# Patient Record
Sex: MALE | URBAN - METROPOLITAN AREA
[De-identification: names, ages, dates, MRNs, and addresses within clinical notes are randomized per-mention and may not be internally consistent; named-entity substitution may affect disease eponyms.]

---

## 2019-09-04 ENCOUNTER — TELEPHONE (OUTPATIENT)
Dept: RHEUMATOLOGY | Age: 39
End: 2019-09-04

## 2019-09-04 NOTE — TELEPHONE ENCOUNTER
Received message from Dr. Matthew Alicea on 9/4/2019 to call patient to let patient know he  do not treat symptoms that the call center had booked npt for on 9/6/2019 left message on patient voice mail. sdh

## 2019-09-05 ENCOUNTER — TELEPHONE (OUTPATIENT)
Dept: RHEUMATOLOGY | Age: 39
End: 2019-09-05

## 2019-09-05 NOTE — TELEPHONE ENCOUNTER
Return call back to patient on 9/5/2019 left voice message to have patient to return call back to office due to appt on 9/6/2019 that doc do not treat symptoms. .. sdh

## 2019-09-05 NOTE — TELEPHONE ENCOUNTER
Return call back to on 9/5/2019 patient 3rd attempt left voice message to call back into the office.  sdh

## 2019-09-05 NOTE — TELEPHONE ENCOUNTER
----- Message from Judith Stapleton RN sent at 9/5/2019  1:23 PM EDT -----  Regarding: FW: Dr. Toby Umanzor      ----- Message -----  From: Candice Uriostegui  Sent: 9/5/2019   1:03 PM EDT  To: Judith Stapleton RN  Subject: FW: Dr. Toby Umanzor                              ----- Message -----  From:  Joy Jiménez  Sent: 9/5/2019   9:46 AM EDT  To: [facility] [place]  Subject: Dr. Toby Umanzor                              Caller: Spouse, Ashley Nurse  Reason for call: concerned about message that Dr. Elda Gaston will not see PT  Callback requested: [place] contact: [phone]  Additional details: appt set 9/6/2019 at 11am

## 2019-09-17 ENCOUNTER — TELEPHONE (OUTPATIENT)
Dept: RHEUMATOLOGY | Age: 39
End: 2019-09-17

## 2019-09-18 ENCOUNTER — OFFICE VISIT (OUTPATIENT)
Dept: RHEUMATOLOGY | Age: 39
End: 2019-09-18

## 2019-09-18 VITALS
WEIGHT: 179.4 LBS | OXYGEN SATURATION: 98 % | DIASTOLIC BLOOD PRESSURE: 112 MMHG | SYSTOLIC BLOOD PRESSURE: 165 MMHG | RESPIRATION RATE: 16 BRPM | HEART RATE: 93 BPM | TEMPERATURE: 98.1 F

## 2019-09-18 DIAGNOSIS — R79.9 ABNORMAL BLOOD CHEMISTRY: Primary | ICD-10-CM

## 2019-09-18 NOTE — PROGRESS NOTES
CHIEF COMPLAINT  The patient was sent for rheumatology consultation for evaluation of Lyme Disease and [facility] Spotted Fever. HISTORY OF PRESENT ILLNESS  This is a 44 y.o.  male. Today, the patient complains of no pain in the joints. Location: None  Severity: 0  on a scale of 0-10  Timing:  All day  Duration: one year    Modifying factors: NA  Context/Associated signs and symptoms: The patient presents today to be evaluated for Lyme disease and [facility] Spotted Fever. His daughter was seen here due to a lyme test that was also read as positive by her PCP. The patient found a small tick one year ago, but did not develop erythema multiforme or additional symptoms. He did not receive antibiotics for this. He had repeat labs ordered from June which we reviewed in detail.  His IgM was positive on both the lyme and [place] spotted fever titer, but were otherwise normal.     RHEUMATOLOGY REVIEW OF SYSTEMS   Positives as per HPI  Negatives as follows:  CONSTITUTlONAL:  Denies unexplained persistent fevers, weight change, chronic fatigue  HEAD/EYES:   Denies eye redness, blurry vision or sudden loss of vision, dry eyes, HA, temporal artery pain  ENT:    Denies oral/nasal ulcers, recurrent sinus infections, dry mouth  RESPIRATORY:  No pleuritic pain, history of pleural effusions, hemoptysis, exertional dyspnea  CARDIOVASCULAR:  Denies chest pain, history of pericardial effusions  GASTRO:   Denies heartburn, esophageal dysmotility, abdominal pain, nausea, vomiting, diarrhea, blood in the stool  HEMATOLOGIC:  No easy bruising, purpura, swollen lymph nodes  SKIN:    Denies alopecia, ulcers, nodules, sun sensitivity, unexplained persistent rash   VASCULAR:   Denies edema, cyanosis, raynaud phenomenon  NEUROLOGIC:  Denies specific muscle weakness, paresthesias   PSYCHIATRIC:  No sleep disturbance / snoring, depression, anxiety  MSK:    No morning stiffness >1 hour, SI joint pain, persistent joint swelling, persistent joint pain    MEDICAL AND SOCIAL HISTORY  This was reviewed with the patient and reviewed in the medical records. No past medical history on file. No past surgical history on file. Social History     Tobacco Use    Smoking status: Not on file   Substance Use Topics    Alcohol use: Not on file    Drug use: Not on file     Employment - Yes  Sleep - Good, no issues  Exercise - yes    FAMILY HISTORY  No autoimmune disease in 1st degree relatives     MEDICATIONS  All the current medications were reviewed in detail. PHYSICAL EXAM  Blood pressure (!) 165/112, pulse 93, temperature 98.1 °F (36.7 °C), temperature source Oral, resp. rate 16, weight 179 lb 6.4 oz (81.4 kg), SpO2 98 %. GENERAL APPEARANCE: Well-nourished adult in no acute distress. EYES: No scleral erythema, conjunctival injection. ENT: No oral ulcer, parotid enlargement. NECK: No adenopathy, thyroid enlargement. CARDIOVASCULAR: Heart rhythm is regular. No murmur, rub, gallop. CHEST: Normal vesicular breath sounds. No wheezes, rales, pleural friction rubs. ABDOMINAL: The abdomen is soft and nontender. Liver and spleen are nonpalpable. Bowel sounds are normal.  EXTREMITIES: There is no evidence of clubbing, cyanosis, edema. SKIN: No rash, palpable purpura, digital ulcer, abnormal thickening. NEUROLOGICAL: Normal gait and station, full strength in upper and lower extremities, normal sensation to light touch. MUSCULOSKELETAL:   Upper extremities - full range of motion, no tenderness, no swelling, no synovial thickening and no deformity of joints. Lower extremities - full range of motion, no tenderness, no swelling, no synovial thickening and no deformity of joints. LABS, RADIOLOGY AND PROCEDURES  Previous labs reviewed -Yes  Previous radiology reviewed -Yes  Previous procedures reviewed -Yes  Previous medical records reviewed/summarized -Yes    ASSESSMENT  1.  Elevated IgM-The patient does not have lyme disease or [place] spotted fever based on his lab work and history. We discussed that the IgG was negative on the lyme titer, which if exposed even a year ago should be elevated. The elevated IgM is often a false positive and is not accurate a year after exposure. Regarding the RMSF test, the IgM assay should not be used as a diagnostic as it has a high rate of false positives. He additionally does not have any of the clinical symptoms of RMSF including the rash, high fever, abdominal pain, or arthralgia. The patient does not require follow up. PLAN  Follow up PRN - patient does not have apparent autoimmune disease at this point and does not need routine followup    Brando Palomino MD  Adult and Pediatric Rheumatology     [address] [facility], [address] [place] [address], Phone [phone], Fax [phone]   E-mail: [email or web address]    Visiting  of Pediatrics    Department of Pediatrics, [facility] of [address] [place] [address], Phone [phone], Fax [phone]  E-mail: [email or web address]    There are no Patient Instructions on file for this visit. cc:  No primary care provider on file. Written by mavis Dominguez, as dictated by Norton Claude.  Latia Palomino M.D.

## 2019-09-18 NOTE — PROGRESS NOTES
Chief Complaint   Patient presents with    Joint Pain     1. Have you been to the ER, urgent care clinic since your last visit? Hospitalized since your last visit? No    2. Have you seen or consulted any other health care providers outside of the [address] [place] [address] since your last visit? Include any pap smears or colon screening.  No